# Patient Record
Sex: MALE | Race: BLACK OR AFRICAN AMERICAN | NOT HISPANIC OR LATINO | URBAN - METROPOLITAN AREA
[De-identification: names, ages, dates, MRNs, and addresses within clinical notes are randomized per-mention and may not be internally consistent; named-entity substitution may affect disease eponyms.]

---

## 2019-07-16 ENCOUNTER — EMERGENCY (EMERGENCY)
Facility: HOSPITAL | Age: 29
LOS: 1 days | Discharge: HOME | End: 2019-07-16
Attending: EMERGENCY MEDICINE | Admitting: EMERGENCY MEDICINE
Payer: MEDICAID

## 2019-07-16 VITALS
OXYGEN SATURATION: 100 % | SYSTOLIC BLOOD PRESSURE: 132 MMHG | DIASTOLIC BLOOD PRESSURE: 77 MMHG | RESPIRATION RATE: 15 BRPM | HEART RATE: 61 BPM | TEMPERATURE: 98 F

## 2019-07-16 PROCEDURE — 99282 EMERGENCY DEPT VISIT SF MDM: CPT

## 2019-07-16 PROCEDURE — 99053 MED SERV 10PM-8AM 24 HR FAC: CPT

## 2019-07-16 NOTE — ED ADULT NURSE NOTE - OBJECTIVE STATEMENT
Pt hx asthma received to the ED reports eating chicken last Thursday and feeling discomfort in throat since then. pt states " I think I have a FB stuck". Pt a&ox4 and ambulatory at baseline, skin intact, respirations even and unlabored, abd soft and non-distended, non-tender to palpation. will continue to monitor.

## 2019-07-16 NOTE — ED PROVIDER NOTE - OBJECTIVE STATEMENT
27yo M hx asthma presenting with what he thinks is a FB in throat. Eating chicken last thursday, thinks a bone got stuck. Uncomfortable since then, one discreet spot in throat causing discomfort. No trouble swallowing, breathing. Denies sob, fevers, increased drooling. Seen at [facility] and sent to [facility] for ENT. Patient declining imaging at this time due to insurance/payment concerns.

## 2019-07-16 NOTE — ED ADULT TRIAGE NOTE - CHIEF COMPLAINT QUOTE
Pt c/o possible chicken bone in throat since Thursday.  Denies any breathing/swallowing.  Endorses minimal pain.  Was originally seen in [facility] and sent here for ENT.  PMHx:  asthma

## 2019-07-16 NOTE — ED PROVIDER NOTE - ATTENDING CONTRIBUTION TO CARE
agree with resident note  "27yo M hx asthma presenting with what he thinks is a FB in throat. Eating chicken last thursday, thinks a bone got stuck. Uncomfortable since then, one discreet spot in throat causing discomfort. No trouble swallowing, breathing. Denies sob, fevers, increased drooling. Seen at [place] and sent to [facility] for ENT."  Due to insurance reasons pt absolutely refusing imaging.  Denies drooling, SOB    PE: well appearing; VSS: CTAB/L; HEENT: no edema, no stridor, no trismus; s1 s2 no m/r/g abd soft/NT/ND     Imp: ENT has scoped and see no FB; pt understands would normally recommend imaging but pt refusing; given precautions on when to return; will d/c home

## 2019-07-16 NOTE — ED ADULT NURSE NOTE - NSIMPLEMENTINTERV_GEN_ALL_ED
Implemented All Universal Safety Interventions:  [place] to call system. Call bell, personal items and telephone within reach. Instruct patient to call for assistance. Room bathroom lighting operational. Non-slip footwear when patient is off stretcher. Physically safe environment: no spills, clutter or unnecessary equipment. Stretcher in lowest position, wheels locked, appropriate side rails in place.

## 2019-07-16 NOTE — ED PROVIDER NOTE - NSFOLLOWUPINSTRUCTIONS_ED_ALL_ED_FT
-Follow-up with ENT within 1 week. Call for appointment. [phone]  -Seen in the ED by ENT.   -Return to the Emergency Department for any worsening or concerning symptoms such as fevers, severe pain, trouble breathing, weakness or lightheadedness, trouble swallowing.

## 2019-07-22 ENCOUNTER — APPOINTMENT (OUTPATIENT)
Dept: OTOLARYNGOLOGY | Facility: CLINIC | Age: 29
End: 2019-07-22

## 2019-07-29 ENCOUNTER — OUTPATIENT (OUTPATIENT)
Dept: OUTPATIENT SERVICES | Facility: HOSPITAL | Age: 29
LOS: 1 days | Discharge: HOME | End: 2019-07-29

## 2019-07-29 ENCOUNTER — APPOINTMENT (OUTPATIENT)
Dept: OTOLARYNGOLOGY | Facility: CLINIC | Age: 29
End: 2019-07-29
Payer: MEDICAID

## 2019-07-29 VITALS
SYSTOLIC BLOOD PRESSURE: 110 MMHG | BODY MASS INDEX: 30.8 KG/M2 | HEART RATE: 60 BPM | HEIGHT: 74 IN | WEIGHT: 240 LBS | DIASTOLIC BLOOD PRESSURE: 74 MMHG

## 2019-07-29 DIAGNOSIS — Z87.09 PERSONAL HISTORY OF OTHER DISEASES OF THE RESPIRATORY SYSTEM: ICD-10-CM

## 2019-07-29 DIAGNOSIS — Z82.49 FAMILY HISTORY OF ISCHEMIC HEART DISEASE AND OTHER DISEASES OF THE CIRCULATORY SYSTEM: ICD-10-CM

## 2019-07-29 DIAGNOSIS — Z78.9 OTHER SPECIFIED HEALTH STATUS: ICD-10-CM

## 2019-07-29 DIAGNOSIS — K21.9 GASTRO-ESOPHAGEAL REFLUX DISEASE W/OUT ESOPHAGITIS: ICD-10-CM

## 2019-07-29 PROCEDURE — 99203 OFFICE O/P NEW LOW 30 MIN: CPT | Mod: 25

## 2019-07-29 PROCEDURE — 31575 DIAGNOSTIC LARYNGOSCOPY: CPT

## 2019-07-29 RX ORDER — FLUTICASONE PROPIONATE AND SALMETEROL XINAFOATE 230; 21 UG/1; UG/1
AEROSOL, METERED RESPIRATORY (INHALATION)
Refills: 0 | Status: ACTIVE | COMMUNITY

## 2019-07-29 RX ORDER — OMEPRAZOLE 20 MG/1
20 CAPSULE, DELAYED RELEASE ORAL DAILY
Qty: 30 | Refills: 2 | Status: ACTIVE | COMMUNITY
Start: 2019-07-29 | End: 1900-01-01

## 2019-07-29 RX ORDER — ALBUTEROL SULFATE 90 UG/1
INHALANT RESPIRATORY (INHALATION)
Refills: 0 | Status: ACTIVE | COMMUNITY

## 2019-07-29 NOTE — HISTORY OF PRESENT ILLNESS
[de-identified] : 28 year old male presents for bone stuck in throat was seen in the ER  two weeks ago. Unsure if bone is still present. States sometimes he feels bone is present, but sometimes it feels it gone. No sharp pain with swallowing. Tolerating liquids and solids. Tolerating secretions. No trouble breathing, no stridor or stertor.  Denies throat pain/itching , dysphagia, dyspnea. Reports intermittent throat clear. Refused xray in ER. States he was scoped in the ER but no bone was found.

## 2019-07-29 NOTE — REVIEW OF SYSTEMS
[As Noted in HPI] : as noted in HPI [Seasonal Allergies] : seasonal allergies [Negative] : Heme/Lymph

## 2019-07-29 NOTE — REASON FOR VISIT
[Initial Consultation] : an initial consultation for [Other: _____] : [unfilled] [FreeTextEntry2] : referred by [facility] ER for bone stuck in throat

## 2019-08-02 DIAGNOSIS — K21.9 GASTRO-ESOPHAGEAL REFLUX DISEASE WITHOUT ESOPHAGITIS: ICD-10-CM

## 2019-10-28 ENCOUNTER — APPOINTMENT (OUTPATIENT)
Dept: OTOLARYNGOLOGY | Facility: CLINIC | Age: 29
End: 2019-10-28

## 2024-01-23 NOTE — ED PROVIDER NOTE - PHYSICAL EXAMINATION
Gen: No acute distress, alert, cooperative  HENT: Normocephalic, atraumatic. moist mucous membranes, no drooling, no stridor  Resp: CTAB, non-labored, speaking without difficulty on room air, no wheeze  CV: rrr, no murmur  MSK: moving all extremities  Skin: warm and dry ,